# Patient Record
Sex: FEMALE | Race: OTHER | Employment: PART TIME | URBAN - METROPOLITAN AREA
[De-identification: names, ages, dates, MRNs, and addresses within clinical notes are randomized per-mention and may not be internally consistent; named-entity substitution may affect disease eponyms.]

---
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
## 2020-06-26 ENCOUNTER — OFFICE VISIT (OUTPATIENT)
Dept: PRIMARY CARE CLINIC | Age: 20
End: 2020-06-26
Payer: MEDICARE

## 2020-06-26 LAB — SARS-COV-2, NAAT: NOT DETECTED

## 2020-06-26 PROCEDURE — G8421 BMI NOT CALCULATED: HCPCS | Performed by: NURSE PRACTITIONER

## 2020-06-26 PROCEDURE — 99211 OFF/OP EST MAY X REQ PHY/QHP: CPT | Performed by: NURSE PRACTITIONER

## 2020-06-26 PROCEDURE — G8428 CUR MEDS NOT DOCUMENT: HCPCS | Performed by: NURSE PRACTITIONER

## 2020-06-26 NOTE — PROGRESS NOTES
Celso Mahoney received a viral test for COVID-19. They were educated on isolation and quarantine as appropriate. For any symptoms, they were directed to seek care from their PCP, given contact information to establish with a doctor, directed to an urgent care or the emergency room.

## 2020-11-01 ENCOUNTER — HOSPITAL ENCOUNTER (OUTPATIENT)
Age: 20
Discharge: HOME | End: 2020-11-01
Attending: EMERGENCY MEDICINE | Admitting: OBSTETRICS & GYNECOLOGY
Payer: MEDICARE

## 2020-11-01 VITALS
TEMPERATURE: 98.8 F | RESPIRATION RATE: 18 BRPM | WEIGHT: 130 LBS | BODY MASS INDEX: 23.04 KG/M2 | HEIGHT: 63 IN | OXYGEN SATURATION: 100 % | DIASTOLIC BLOOD PRESSURE: 67 MMHG | SYSTOLIC BLOOD PRESSURE: 98 MMHG | HEART RATE: 80 BPM

## 2020-11-01 LAB
A/G RATIO: 1 (ref 1.1–2.2)
ABO/RH: NORMAL
ALBUMIN SERPL-MCNC: 3.1 G/DL (ref 3.4–5)
ALP BLD-CCNC: 64 U/L (ref 40–129)
ALT SERPL-CCNC: 6 U/L (ref 10–40)
ANION GAP SERPL CALCULATED.3IONS-SCNC: 11 MMOL/L (ref 3–16)
AST SERPL-CCNC: 10 U/L (ref 15–37)
BACTERIA: ABNORMAL /HPF
BASOPHILS ABSOLUTE: 0 K/UL (ref 0–0.2)
BASOPHILS RELATIVE PERCENT: 0.6 %
BILIRUB SERPL-MCNC: 0.4 MG/DL (ref 0–1)
BILIRUBIN URINE: NEGATIVE
BLOOD, URINE: NEGATIVE
BUN BLDV-MCNC: 6 MG/DL (ref 7–20)
CALCIUM SERPL-MCNC: 8.6 MG/DL (ref 8.3–10.6)
CHLORIDE BLD-SCNC: 98 MMOL/L (ref 99–110)
CLARITY: ABNORMAL
CO2: 22 MMOL/L (ref 21–32)
COLOR: YELLOW
CREAT SERPL-MCNC: 0.5 MG/DL (ref 0.6–1.1)
EOSINOPHILS ABSOLUTE: 0.2 K/UL (ref 0–0.6)
EOSINOPHILS RELATIVE PERCENT: 2.8 %
EPITHELIAL CELLS, UA: 13 /HPF (ref 0–5)
GFR AFRICAN AMERICAN: >60
GFR NON-AFRICAN AMERICAN: >60
GLOBULIN: 3.1 G/DL
GLUCOSE BLD-MCNC: 78 MG/DL (ref 70–99)
GLUCOSE URINE: NEGATIVE MG/DL
HCT VFR BLD CALC: 30.8 % (ref 36–48)
HEMOGLOBIN: 10.2 G/DL (ref 12–16)
HYALINE CASTS: 5 /LPF (ref 0–8)
KETONES, URINE: NEGATIVE MG/DL
LEUKOCYTE ESTERASE, URINE: ABNORMAL
LYMPHOCYTES ABSOLUTE: 1 K/UL (ref 1–5.1)
LYMPHOCYTES RELATIVE PERCENT: 12 %
MCH RBC QN AUTO: 29 PG (ref 26–34)
MCHC RBC AUTO-ENTMCNC: 33 G/DL (ref 31–36)
MCV RBC AUTO: 87.7 FL (ref 80–100)
MICROSCOPIC EXAMINATION: YES
MONOCYTES ABSOLUTE: 0.5 K/UL (ref 0–1.3)
MONOCYTES RELATIVE PERCENT: 6.8 %
NEUTROPHILS ABSOLUTE: 6.2 K/UL (ref 1.7–7.7)
NEUTROPHILS RELATIVE PERCENT: 77.8 %
NITRITE, URINE: NEGATIVE
PDW BLD-RTO: 14.9 % (ref 12.4–15.4)
PH UA: 7 (ref 5–8)
PLATELET # BLD: 237 K/UL (ref 135–450)
PMV BLD AUTO: 10.7 FL (ref 5–10.5)
POTASSIUM REFLEX MAGNESIUM: 3.8 MMOL/L (ref 3.5–5.1)
PROTEIN UA: NEGATIVE MG/DL
RBC # BLD: 3.51 M/UL (ref 4–5.2)
RBC UA: 3 /HPF (ref 0–4)
SODIUM BLD-SCNC: 131 MMOL/L (ref 136–145)
SPECIFIC GRAVITY UA: 1.03 (ref 1–1.03)
TOTAL PROTEIN: 6.2 G/DL (ref 6.4–8.2)
URINE TYPE: ABNORMAL
UROBILINOGEN, URINE: 1 E.U./DL
WBC # BLD: 7.9 K/UL (ref 4–11)
WBC UA: 12 /HPF (ref 0–5)

## 2020-11-01 PROCEDURE — 99203 OFFICE O/P NEW LOW 30 MIN: CPT

## 2020-11-01 PROCEDURE — 85025 COMPLETE CBC W/AUTO DIFF WBC: CPT

## 2020-11-01 PROCEDURE — 80053 COMPREHEN METABOLIC PANEL: CPT

## 2020-11-01 PROCEDURE — 86901 BLOOD TYPING SEROLOGIC RH(D): CPT

## 2020-11-01 PROCEDURE — 36415 COLL VENOUS BLD VENIPUNCTURE: CPT

## 2020-11-01 PROCEDURE — 99284 EMERGENCY DEPT VISIT MOD MDM: CPT

## 2020-11-01 PROCEDURE — 81001 URINALYSIS AUTO W/SCOPE: CPT

## 2020-11-01 PROCEDURE — 86900 BLOOD TYPING SEROLOGIC ABO: CPT

## 2020-11-01 ASSESSMENT — ENCOUNTER SYMPTOMS
NAUSEA: 0
WHEEZING: 0
SORE THROAT: 0
VOMITING: 0
SHORTNESS OF BREATH: 0
DIARRHEA: 0
ABDOMINAL PAIN: 0
EYE DISCHARGE: 0
BACK PAIN: 0
EYE PAIN: 0
RHINORRHEA: 0
COUGH: 0

## 2020-11-01 ASSESSMENT — PAIN DESCRIPTION - LOCATION: LOCATION: ABDOMEN

## 2020-11-01 ASSESSMENT — PAIN DESCRIPTION - PAIN TYPE: TYPE: ACUTE PAIN

## 2020-11-01 ASSESSMENT — PAIN SCALES - GENERAL: PAINLEVEL_OUTOF10: 5

## 2020-11-01 NOTE — PROGRESS NOTES
Department of Obstetrics and Gynecology  Triage Evaluation Note    SUBJECTIVE:  Jamie Sousa is a 23 y.o.,  at unknown gestation  who presents for abd discomfort. She denies vaginal bleeding, leakage of fluid, or contractions. She states the baby is  moving well. She denies fever, shortness of breath, palpitations, nausea, vomiting, diarrhea on ROS. I personally reviewed the past medical and surgical histories, as well as the problem list.    OBJECTIVE:  Vital Signs: BP 98/67   Pulse 80   Temp 98.8 °F (37.1 °C) (Oral)   Resp 18   Ht 5' 3\" (1.6 m)   Wt 130 lb (59 kg)   SpO2 100%   BMI 23.03 kg/m²   Appearance/Psychiatric: alert and oriented X3  Constitutional: Appears well, no distress  Cardiovascular: She does not have edema. Respiratory:Respiratory effort is normal.  Gastrointestinal: soft, non tender, Gravid. The uterine size is equal to 20-22 weeks    Pelvic: Cervix NE.   NST read: +FHT  [place]: NA    LABS:    CBC:   Lab Results   Component Value Date    WBC 7.9 2020    RBC 3.51 2020    HGB 10.2 2020    HCT 30.8 2020    MCV 87.7 2020    RDW 14.9 2020     2020       Urine Drug Screen negative    ASSESSMENT AND PLAN:  23 y.o.  approx 21 wk gestation. No clear source of abd discomfort. Nl WBC, abd is gravid minimally tender but non surgical. Has appt with Dr. Bj Rojas in am. Will keep appt. Await UA results  <principal problem not specified>    The patient will follow up in 1 day with . She was counseled to call or return for vaginal bleeding, regular contractions, leakage of fluid or decreased fetal movement.     Electronically signed by Rigoberto Bedolla MD on 2020 at 11:58 AM

## 2020-11-01 NOTE — ED PROVIDER NOTES
[facility]  eMERGENCY dEPARTMENT eNCOUnter        Pt Name: Trevon Amor  MRN: [id number]  [place] 2000  Date of evaluation: 11/1/2020  Provider: Екатерина Grubbs MD  PCP: [facility]       Chief Complaint   Patient presents with    Abdominal Pain     intermit cramping    Pregnancy Problem     pt states no preinatal care, has appointment tomorrow, woke up with pain        HISTORY OFPRESENT ILLNESS   (Location/Symptom, Timing/Onset, Context/Setting, Quality, Duration, Modifying Factors,Severity)  Note limiting factors. Trevon mAor is a 23 y.o. female       Location/Symptom: Pelvic pain  Timing/Onset: This morning  Context/Setting: Gravid 2 para 1. She is not really sure of her last normal menstrual period but it was either June or July. States she has been under a lot of stress and was not really paying attention. She not having any vaginal bleeding. Quality: Sharp and crampy  Duration: Repeated episodes lasting about a minute  Modifying Factors: No fever chills nausea vomiting or urinary symptoms and no vaginal bleeding  Severity: 5 out of 10    Nursing Noteswere all reviewed and agreed with or any disagreements were addressed  in the HPI. REVIEW OF SYSTEMS    (2-9 systems for level 4, 10 or more for level 5)     Review of Systems   Constitutional: Negative for chills, fatigue and fever. HENT: Negative for ear pain, rhinorrhea and sore throat. Eyes: Negative for pain, discharge and visual disturbance. Respiratory: Negative for cough, shortness of breath and wheezing. Cardiovascular: Negative for chest pain, palpitations and leg swelling. Gastrointestinal: Negative for abdominal pain, diarrhea, nausea and vomiting. Genitourinary: Positive for pelvic pain. Negative for difficulty urinating, dysuria and vaginal discharge. Musculoskeletal: Negative for arthralgias, back pain, joint swelling and neck pain.    Skin: Negative for rash.   Allergic/Immunologic: Negative for environmental allergies. Neurological: Negative for dizziness, seizures, syncope and headaches. Hematological: Negative for adenopathy. Psychiatric/Behavioral: Negative for dysphoric mood and suicidal ideas. The patient is not nervous/anxious. PAST MEDICAL HISTORY     Past Medical History:   Diagnosis Date    Asthma     Chiari malformation     Chronic kidney disease     kidney stones    Seizures ([facility] [address]     Epilepsy         SURGICAL HISTORY     Past Surgical History:   Procedure Laterality Date    TONSILLECTOMY           CURRENTMEDICATIONS       Discharge Medication List as of 11/1/2020 12:45 PM      CONTINUE these medications which have NOT CHANGED    Details   topiramate (TOPAMAX) 25 MG tablet Take 150 mg by mouth 2 times daily Historical Med      Ascorbic Acid (VITAMIN C) 250 MG tablet Take 250 mg by mouth 2 times dailyHistorical Med      Prenatal w/o A Vit-Fe Fum-FA (PRENATA PO) Take 1 tablet by mouth dailyHistorical Med      folic acid (FOLVITE) 1 MG tablet Take 1 mg by mouth daily             ALLERGIES     Patient has no known allergies.     FAMILY HISTORY       Family History   Problem Relation Age of Onset    Depression Mother     High Blood Pressure Mother     Allergy (Severe) Sister    [place] Anemia Sister     Asthma Sister     Depression Sister     Kidney Disease Sister     Mental Illness Sister     Anemia Brother     Asthma Brother     Breast Cancer Maternal Grandfather     Hearing Loss Maternal Grandfather     Cancer Paternal Grandmother     Prostate Cancer Paternal Grandmother           SOCIAL HISTORY       Social History     Socioeconomic History    Marital status: Single     Spouse name: None    Number of children: None    Years of education: None    Highest education level: None   Occupational History    None   Social Needs    Financial resource strain: None    Food insecurity     Worry: None     Inability: None    Transportation needs     Medical: None     Non-medical: None   Tobacco Use    Smoking status: Never Smoker    Smokeless tobacco: Never Used   Substance and Sexual Activity    Alcohol use: No    Drug use: No    Sexual activity: Yes   Lifestyle    Physical activity     Days per week: None     Minutes per session: None    Stress: None   Relationships    Social connections     Talks on phone: None     Gets together: None     Attends Islam service: None     Active member of club or organization: None     Attends meetings of clubs or organizations: None     Relationship status: None    Intimate partner violence     Fear of current or ex partner: None     Emotionally abused: None     Physically abused: None     Forced sexual activity: None   Other Topics Concern    None   Social History Narrative    None       SCREENINGS    [place] Coma Scale  Eye Opening: Spontaneous  Best Verbal Response: Oriented  Best Motor Response: Obeys commands  [place] Coma Scale Score: 15        PHYSICAL EXAM    (up to 7 for level 4, 8 or more for level 5)     ED Triage Vitals   BP Temp Temp src Pulse Resp SpO2 Height Weight   -- -- -- -- -- -- -- --      height is 5' 3\" (1.6 m) and weight is 130 lb (59 kg). Her oral temperature is 98.8 °F (37.1 °C). Her blood pressure is 98/67 and her pulse is 80. Her respiration is 18 and oxygen saturation is 100%. Physical Exam  Constitutional:       Appearance: She is well-developed. She is not diaphoretic. HENT:      Head: Normocephalic and atraumatic. Right Ear: External ear normal.      Left Ear: External ear normal.   Eyes:      General: No scleral icterus. Right eye: No discharge. Left eye: No discharge. Neck:      Musculoskeletal: Normal range of motion. Thyroid: No thyromegaly. Vascular: No JVD. Trachea: No tracheal deviation. Cardiovascular:      Rate and Rhythm: Normal rate and regular rhythm. Heart sounds: No murmur. No friction rub. No gallop. Pulmonary:      Effort: Pulmonary effort is normal. No respiratory distress. Breath sounds: Normal breath sounds. No stridor. No wheezing or rales. Abdominal:      General: There is no distension. Palpations: Abdomen is soft. Tenderness: There is abdominal tenderness in the periumbilical area and suprapubic area. There is no guarding or rebound. Comments: Gravid uterus. I can feel the dome of the uterus just slightly above the umbilicus. The uterus itself is what is tender. The rest of the abdomen is benign without rebound or guarding. Musculoskeletal:         General: No tenderness. Skin:     General: Skin is warm and dry. Findings: No rash (On exposed body surfaces). Neurological:      Mental Status: She is alert and oriented to person, place, and time. Coordination: Coordination normal.   Psychiatric:         Behavior: Behavior normal.         Thought Content:  Thought content normal.         DIAGNOSTIC RESULTS   LABS:    Results for orders placed or performed during the hospital encounter of 11/01/20   CBC Auto Differential   Result Value Ref Range    WBC 7.9 4.0 - 11.0 K/uL    RBC 3.51 (L) 4.00 - 5.20 M/uL    Hemoglobin 10.2 (L) 12.0 - 16.0 g/dL    Hematocrit 30.8 (L) 36.0 - 48.0 %    MCV 87.7 80.0 - 100.0 fL    MCH 29.0 26.0 - 34.0 pg    MCHC 33.0 31.0 - 36.0 g/dL    RDW 14.9 12.4 - 15.4 %    Platelets 587 204 - 230 K/uL    MPV 10.7 (H) 5.0 - 10.5 fL    Neutrophils % 77.8 %    Lymphocytes % 12.0 %    Monocytes % 6.8 %    Eosinophils % 2.8 %    Basophils % 0.6 %    Neutrophils Absolute 6.2 1.7 - 7.7 K/uL    Lymphocytes Absolute 1.0 1.0 - 5.1 K/uL    Monocytes Absolute 0.5 0.0 - 1.3 K/uL    Eosinophils Absolute 0.2 0.0 - 0.6 K/uL    Basophils Absolute 0.0 0.0 - 0.2 K/uL   Comprehensive Metabolic Panel w/ Reflex to MG   Result Value Ref Range    Sodium 131 (L) 136 - 145 mmol/L    Potassium reflex Magnesium 3.8 3.5 - 5.1 mmol/L    Chloride 98 (L) 99 - 110 mmol/L    CO2 22 21 - 32 mmol/L    Anion Gap 11 3 - 16    Glucose 78 70 - 99 mg/dL    BUN 6 (L) 7 - 20 mg/dL    CREATININE 0.5 (L) 0.6 - 1.1 mg/dL    GFR Non-African American >60 >60    GFR African American >60 >60    Calcium 8.6 8.3 - 10.6 mg/dL    Total Protein 6.2 (L) 6.4 - 8.2 g/dL    Alb 3.1 (L) 3.4 - 5.0 g/dL    Albumin/Globulin Ratio 1.0 (L) 1.1 - 2.2    Total Bilirubin 0.4 0.0 - 1.0 mg/dL    Alkaline Phosphatase 64 40 - 129 U/L    ALT 6 (L) 10 - 40 U/L    AST 10 (L) 15 - 37 U/L    Globulin 3.1 g/dL   Urinalysis   Result Value Ref Range    Color, UA YELLOW Straw/Yellow    Clarity, UA CLOUDY (A) Clear    Glucose, Ur Negative Negative mg/dL    Bilirubin Urine Negative Negative    Ketones, Urine Negative Negative mg/dL    Specific Gravity, UA 1.026 1.005 - 1.030    Blood, Urine Negative Negative    pH, UA 7.0 5.0 - 8.0    Protein, UA Negative Negative mg/dL    Urobilinogen, Urine 1.0 <2.0 E.U./dL    Nitrite, Urine Negative Negative    Leukocyte Esterase, Urine MODERATE (A) Negative    Microscopic Examination YES     Urine Type NotGiven    Microscopic Urinalysis   Result Value Ref Range    Bacteria, UA 1+ (A) None Seen /HPF    Hyaline Casts, UA 5 0 - 8 /LPF    WBC, UA 12 (H) 0 - 5 /HPF    RBC, UA 3 0 - 4 /HPF    Epithelial Cells, UA 13 (H) 0 - 5 /HPF   ABO/RH   Result Value Ref Range    ABO/Rh O POS        All other labs were within normal range or not returned as of this dictation. EKG: All EKG's are interpreted by the Emergency Department Physician who either signs orCo-signs this chart in the absence of a cardiologist.    None    RADIOLOGY:   Non-plain film images such as CT, Ultrasound and MRI are read by the radiologist. Plain radiographic images are visualized and preliminarily interpreted by the  EDProvider with the below findings:    I discussed this case with Dr. Denisse Carmona on-call for obstetrics. He has for me to get a formal ultrasound to confirm gestational age.   So, I did order one but I was informed by the ultrasound technician that they only come in for rule out ectopics with regards to pregnancy. I did a bedside ultrasound. The patient is clearly pregnant. Fetal heart tones were 144-150. I was unable to save images to the machine just to be acuity and volume in the department. PROCEDURES   Unless otherwise noted below, none     Procedures    CRITICAL CARE TIME   N/A    CONSULTS:  None    EMERGENCY DEPARTMENT COURSE and DIFFERENTIAL DIAGNOSIS/MDM:   Vitals:    Vitals:    11/01/20 1115 11/01/20 1147 11/01/20 1156   BP: 99/70  98/67   Pulse: 60  80   Resp: 20  18   Temp: 98.6 °F (37 °C)  98.8 °F (37.1 °C)   TempSrc: Oral  Oral   SpO2: 100%     Weight:   130 lb (59 kg)   Height:  5' 3\" (1.6 m)        Patient was given the following medications:  Medications - No data to display    I called Dr. Baron Kay back and explained to him the issue with ultrasound so at that point he agreed to see the patient in labor and delivery. Did order an IV and a set of labs as well as a blood type. FINAL IMPRESSION      1. Abdominal pain during pregnancy in second trimester          DISPOSITION/PLAN    DISPOSITION Decision To Discharge 11/01/2020 11:33:17 AM      PATIENT REFERRED TO:  MD Benjamín Boyd [facility]. [phone]  [phone]    Go in 1 day  follow-up with Dr Ivonne Barajas on 11/02/2020 at 1100.  Yvonne PEREIRA review urine culture obtained at [address] [place] [address] L:  Discharge Medication List as of 11/1/2020 12:45 PM          DISCONTINUED MEDICATIONS:  Discharge Medication List as of 11/1/2020 12:45 PM                 (Please note that portions of this note were completed with a voice recognition program.  Efforts were made to editthe dictations but occasionally words are mis-transcribed.)    Leigha Galvez MD (electronically signed)            Leigha Galvez MD  11/01/20 2519

## 2020-11-01 NOTE — FLOWSHEET NOTE
After completion of the Medical Screening Evaluation, it has been determined that a medical emergency does not exist and the patient is not in active labor, and therefore the patient may be discharged home. IV site placed in ED removed. D/C instructions given and reviewed. Pt discharged home per W/C to Lisa car with belongings.

## 2021-08-21 ENCOUNTER — HOSPITAL ENCOUNTER (INPATIENT)
Age: 21
LOS: 4 days | Discharge: HOME | DRG: 751 | End: 2021-08-25
Attending: EMERGENCY MEDICINE | Admitting: PSYCHIATRY & NEUROLOGY
Payer: MEDICARE

## 2021-08-21 DIAGNOSIS — R45.851 SUICIDAL IDEATION: Primary | ICD-10-CM

## 2021-08-21 PROBLEM — F39 UNSPECIFIED MOOD (AFFECTIVE) DISORDER (HCC): Status: ACTIVE | Noted: 2021-08-21

## 2021-08-21 LAB
A/G RATIO: 1.6 (ref 1.1–2.2)
ACETAMINOPHEN LEVEL: <5 UG/ML (ref 10–30)
ALBUMIN SERPL-MCNC: 4.2 G/DL (ref 3.4–5)
ALP BLD-CCNC: 60 U/L (ref 40–129)
ALT SERPL-CCNC: 13 U/L (ref 10–40)
AMPHETAMINE SCREEN, URINE: NORMAL
ANION GAP SERPL CALCULATED.3IONS-SCNC: 9 MMOL/L (ref 3–16)
AST SERPL-CCNC: 16 U/L (ref 15–37)
BACTERIA: ABNORMAL /HPF
BARBITURATE SCREEN URINE: NORMAL
BASOPHILS ABSOLUTE: 0 K/UL (ref 0–0.2)
BASOPHILS RELATIVE PERCENT: 0.4 %
BENZODIAZEPINE SCREEN, URINE: NORMAL
BILIRUB SERPL-MCNC: 1 MG/DL (ref 0–1)
BILIRUBIN URINE: NEGATIVE
BLOOD, URINE: NEGATIVE
BUN BLDV-MCNC: 13 MG/DL (ref 7–20)
CALCIUM SERPL-MCNC: 9.3 MG/DL (ref 8.3–10.6)
CANNABINOID SCREEN URINE: NORMAL
CHLORIDE BLD-SCNC: 103 MMOL/L (ref 99–110)
CLARITY: CLEAR
CO2: 22 MMOL/L (ref 21–32)
COCAINE METABOLITE SCREEN URINE: NORMAL
COLOR: YELLOW
CREAT SERPL-MCNC: 0.8 MG/DL (ref 0.6–1.1)
EOSINOPHILS ABSOLUTE: 0 K/UL (ref 0–0.6)
EOSINOPHILS RELATIVE PERCENT: 0.6 %
EPITHELIAL CELLS, UA: ABNORMAL /HPF (ref 0–5)
ETHANOL: NORMAL MG/DL (ref 0–0.08)
GFR AFRICAN AMERICAN: >60
GFR NON-AFRICAN AMERICAN: >60
GLOBULIN: 2.6 G/DL
GLUCOSE BLD-MCNC: 98 MG/DL (ref 70–99)
GLUCOSE URINE: NEGATIVE MG/DL
HCG(URINE) PREGNANCY TEST: NEGATIVE
HCT VFR BLD CALC: 33.3 % (ref 36–48)
HEMOGLOBIN: 10.8 G/DL (ref 12–16)
KETONES, URINE: NEGATIVE MG/DL
LEUKOCYTE ESTERASE, URINE: ABNORMAL
LYMPHOCYTES ABSOLUTE: 0.8 K/UL (ref 1–5.1)
LYMPHOCYTES RELATIVE PERCENT: 11.6 %
Lab: NORMAL
MCH RBC QN AUTO: 28 PG (ref 26–34)
MCHC RBC AUTO-ENTMCNC: 32.3 G/DL (ref 31–36)
MCV RBC AUTO: 86.5 FL (ref 80–100)
METHADONE SCREEN, URINE: NORMAL
MICROSCOPIC EXAMINATION: YES
MONOCYTES ABSOLUTE: 0.4 K/UL (ref 0–1.3)
MONOCYTES RELATIVE PERCENT: 5.3 %
NEUTROPHILS ABSOLUTE: 5.5 K/UL (ref 1.7–7.7)
NEUTROPHILS RELATIVE PERCENT: 82.1 %
NITRITE, URINE: NEGATIVE
OPIATE SCREEN URINE: NORMAL
OXYCODONE URINE: NORMAL
PDW BLD-RTO: 15.3 % (ref 12.4–15.4)
PH UA: 6
PH UA: 6 (ref 5–8)
PHENCYCLIDINE SCREEN URINE: NORMAL
PLATELET # BLD: 286 K/UL (ref 135–450)
PMV BLD AUTO: 10.8 FL (ref 5–10.5)
POTASSIUM REFLEX MAGNESIUM: 3.8 MMOL/L (ref 3.5–5.1)
PROPOXYPHENE SCREEN: NORMAL
PROTEIN UA: NEGATIVE MG/DL
RBC # BLD: 3.85 M/UL (ref 4–5.2)
RBC UA: ABNORMAL /HPF (ref 0–4)
SALICYLATE, SERUM: <0.3 MG/DL (ref 15–30)
SARS-COV-2, NAAT: NOT DETECTED
SODIUM BLD-SCNC: 134 MMOL/L (ref 136–145)
SPECIFIC GRAVITY UA: >=1.03 (ref 1–1.03)
TOTAL PROTEIN: 6.8 G/DL (ref 6.4–8.2)
URINE REFLEX TO CULTURE: ABNORMAL
URINE TYPE: ABNORMAL
UROBILINOGEN, URINE: 0.2 E.U./DL
WBC # BLD: 6.7 K/UL (ref 4–11)
WBC UA: ABNORMAL /HPF (ref 0–5)

## 2021-08-21 PROCEDURE — 80143 DRUG ASSAY ACETAMINOPHEN: CPT

## 2021-08-21 PROCEDURE — 81001 URINALYSIS AUTO W/SCOPE: CPT

## 2021-08-21 PROCEDURE — 6370000000 HC RX 637 (ALT 250 FOR IP): Performed by: PSYCHIATRY & NEUROLOGY

## 2021-08-21 PROCEDURE — 87635 SARS-COV-2 COVID-19 AMP PRB: CPT

## 2021-08-21 PROCEDURE — 82077 ASSAY SPEC XCP UR&BREATH IA: CPT

## 2021-08-21 PROCEDURE — 93005 ELECTROCARDIOGRAM TRACING: CPT | Performed by: EMERGENCY MEDICINE

## 2021-08-21 PROCEDURE — 1240000000 HC EMOTIONAL WELLNESS R&B

## 2021-08-21 PROCEDURE — 80307 DRUG TEST PRSMV CHEM ANLYZR: CPT

## 2021-08-21 PROCEDURE — 99284 EMERGENCY DEPT VISIT MOD MDM: CPT

## 2021-08-21 PROCEDURE — 80053 COMPREHEN METABOLIC PANEL: CPT

## 2021-08-21 PROCEDURE — 84703 CHORIONIC GONADOTROPIN ASSAY: CPT

## 2021-08-21 PROCEDURE — 80179 DRUG ASSAY SALICYLATE: CPT

## 2021-08-21 PROCEDURE — 85025 COMPLETE CBC W/AUTO DIFF WBC: CPT

## 2021-08-21 RX ORDER — TRAZODONE HYDROCHLORIDE 50 MG/1
50 TABLET ORAL NIGHTLY PRN
Status: DISCONTINUED | OUTPATIENT
Start: 2021-08-21 | End: 2021-08-25 | Stop reason: HOSPADM

## 2021-08-21 RX ORDER — OLANZAPINE 10 MG/1
10 INJECTION, POWDER, LYOPHILIZED, FOR SOLUTION INTRAMUSCULAR 3 TIMES DAILY PRN
Status: DISCONTINUED | OUTPATIENT
Start: 2021-08-21 | End: 2021-08-25 | Stop reason: HOSPADM

## 2021-08-21 RX ORDER — OLANZAPINE 5 MG/1
5 TABLET ORAL EVERY 6 HOURS PRN
Status: DISCONTINUED | OUTPATIENT
Start: 2021-08-21 | End: 2021-08-25 | Stop reason: HOSPADM

## 2021-08-21 RX ORDER — HYDROXYZINE HYDROCHLORIDE 10 MG/1
50 TABLET, FILM COATED ORAL EVERY 6 HOURS PRN
Status: DISCONTINUED | OUTPATIENT
Start: 2021-08-21 | End: 2021-08-25 | Stop reason: HOSPADM

## 2021-08-21 RX ORDER — MAGNESIUM HYDROXIDE/ALUMINUM HYDROXICE/SIMETHICONE 120; 1200; 1200 MG/30ML; MG/30ML; MG/30ML
30 SUSPENSION ORAL EVERY 6 HOURS PRN
Status: DISCONTINUED | OUTPATIENT
Start: 2021-08-21 | End: 2021-08-25 | Stop reason: HOSPADM

## 2021-08-21 RX ORDER — ACETAMINOPHEN 325 MG/1
650 TABLET ORAL EVERY 4 HOURS PRN
Status: DISCONTINUED | OUTPATIENT
Start: 2021-08-21 | End: 2021-08-25 | Stop reason: HOSPADM

## 2021-08-21 RX ORDER — BENZTROPINE MESYLATE 1 MG/ML
2 INJECTION INTRAMUSCULAR; INTRAVENOUS 2 TIMES DAILY PRN
Status: DISCONTINUED | OUTPATIENT
Start: 2021-08-21 | End: 2021-08-25 | Stop reason: HOSPADM

## 2021-08-21 RX ADMIN — TRAZODONE HYDROCHLORIDE 50 MG: 50 TABLET ORAL at 22:12

## 2021-08-21 RX ADMIN — ACETAMINOPHEN 650 MG: 325 TABLET ORAL at 22:12

## 2021-08-21 ASSESSMENT — SLEEP AND FATIGUE QUESTIONNAIRES
DO YOU HAVE DIFFICULTY SLEEPING: NO
DO YOU USE A SLEEP AID: NO

## 2021-08-21 ASSESSMENT — PAIN SCALES - GENERAL
PAINLEVEL_OUTOF10: 2
PAINLEVEL_OUTOF10: 7

## 2021-08-21 ASSESSMENT — LIFESTYLE VARIABLES: HISTORY_ALCOHOL_USE: NO

## 2021-08-21 NOTE — ED NOTES
Patient reports she feels safe here and reports she will not harm self here. Repots she feels better. Patient able to contract for safety will notify Dr. Hattie Gregorying.       Benigno Dean RN  08/21/21 8766
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
## 2021-08-21 NOTE — ED PROVIDER NOTES
 Kidney Disease Sister     Mental Illness Sister     Anemia Brother     Asthma Brother     Breast Cancer Maternal Grandfather     Hearing Loss Maternal Grandfather     Cancer Paternal Grandmother     Prostate Cancer Paternal Grandmother        Social History     Socioeconomic History    Marital status: Single     Spouse name: Not on file    Number of children: Not on file    Years of education: Not on file    Highest education level: Not on file   Occupational History    Not on file   Tobacco Use    Smoking status: Never Smoker    Smokeless tobacco: Never Used   Vaping Use    Vaping Use: Never used   Substance and Sexual Activity    Alcohol use: No    Drug use: No    Sexual activity: Yes   Other Topics Concern    Not on file   Social History Narrative    Not on file     Social Determinants of Health     Financial Resource Strain:     Difficulty of Paying Living Expenses:    Food Insecurity:     Worried About Running Out of Food in the Last Year:     Ran Out of Food in the Last Year:    Transportation Needs:     Lack of Transportation (Medical):  Lack of Transportation (Non-Medical):    Physical Activity:     Days of Exercise per Week:     Minutes of Exercise per Session:    Stress:     Feeling of Stress :    Social Connections:     Frequency of Communication with Friends and Family:     Frequency of Social Gatherings with Friends and Family:     Attends Yazidi Services:     Active Member of Clubs or Organizations:     Attends Club or Organization Meetings:     Marital Status:    Intimate Partner Violence:     Fear of Current or Ex-Partner:     Emotionally Abused:     Physically Abused:     Sexually Abused:        Nursing notes reviewed.     ED Triage Vitals [08/21/21 1306]   Enc Vitals Group      /75      Pulse 107      Resp 16      Temp 98.2 °F (36.8 °C)      Temp Source Oral      SpO2 98 %      Weight 160 lb (72.6 kg)      Height 5' 3\" (1.6 m)      Head Circumference       Peak Flow       Pain Score       Pain Loc       Pain Edu? Excl. in [address]? GENERAL:  Awake, alert. Well developed, well nourished with no apparent distress. HENT:  Normocephalic, Atraumatic, moist mucous membranes. EYES:  Pupils equal round and reactive to light, Conjunctiva normal, extraocular movements normal.  NECK:  No meningeal signs, Supple. CHEST:  Regular rate and rhythm, chest wall non-tender. LUNGS:  Clear to auscultation bilaterally. ABDOMEN:  Soft, non-tender, no rebound, rigidity or guarding, non-distended, normal bowel sounds. No costovertebral angle tenderness to palpation. BACK:  No tenderness. EXTREMITIES:  Normal range of motion, no edema, no bony tenderness, no deformity, distal pulses present. SKIN: Warm, dry and intact. NEUROLOGIC: Awake, alert and oriented to person, place and time. Strength 5/5 in bilateral upper and lower extremities. Sensation is intact to light touch in the upper and lower extremities. Cranial Nerves 2-12 are intact. Patellar DTRs intact. Finger-to-nose intact. LABS and DIAGNOSTIC RESULTS  EKG  The Ekg interpreted by me shows  normal sinus rhythm with a rate of 72  Axis is   Normal  QTc is  normal  Intervals and Durations are unremarkable. ST Segments: normal  No change relative to EKG dated 1/21/2019      RADIOLOGY  X-RAYS:  I have reviewed radiologic plain film image(s). ALL OTHER NON-PLAIN FILM IMAGES SUCH AS CT, ULTRASOUND AND MRI HAVE BEEN READ BY THE RADIOLOGIST.   No orders to display        LABS  Labs Reviewed   CBC WITH AUTO DIFFERENTIAL - Abnormal; Notable for the following components:       Result Value    RBC 3.85 (*)     Hemoglobin 10.8 (*)     Hematocrit 33.3 (*)     MPV 10.8 (*)     Lymphocytes Absolute 0.8 (*)     All other components within normal limits    Narrative:     Performed at:  [facility],  [place], [facility]   Phone [phone] COMPREHENSIVE METABOLIC PANEL W/ REFLEX TO MG FOR LOW K - Abnormal; Notable for the following components:    Sodium 134 (*)     All other components within normal limits    Narrative:     Performed at:  [facility],  [employer]   Phone [phone]   URINE RT REFLEX TO CULTURE - Abnormal; Notable for the following components:    Leukocyte Esterase, Urine TRACE (*)     All other components within normal limits    Narrative:     Performed at:  [place] [address],  [employer]   Phone [phone]   ACETAMINOPHEN LEVEL - Abnormal; Notable for the following components:    Acetaminophen Level <5 (*)     All other components within normal limits    Narrative:     Performed at:  [place] [address],  [employer]   Phone [phone]   SALICYLATE LEVEL - Abnormal; Notable for the following components:    Salicylate, Serum <4.6 (*)     All other components within normal limits    Narrative:     Performed at:  [facility] [address],  [employer]   Phone [phone]   MICROSCOPIC URINALYSIS - Abnormal; Notable for the following components:    Bacteria, UA 2+ (*)     All other components within normal limits    Narrative:     Performed at:  [place] [address],  [employer]   Phone [phone], RAPID    Narrative:     Performed at:  [place] [address],  [employer]   Phone [phone]   ETHANOL    Narrative:     Performed at:  [place] [address],  [employer]   Phone [phone]   URINE DRUG SCREEN    Narrative:     Performed at:  [place] [address],  [employer]   Phone [phone] PREGNANCY, URINE    Narrative:     Performed at:  [facility]) - [facility]  [address],  [place], [facility]   Phone [phone]         MEDICAL DECISION MAKING          In my opinion the patient is medically stable for inpatient psychiatric treatment, medically cleared. FINAL IMPRESSION  1. Suicidal ideation    2. Post partum depression        Vitals:  Blood pressure 118/75, pulse 107, temperature 98.2 °F (36.8 °C), temperature source Oral, resp. rate 16, height 5' 3\" (1.6 m), weight 160 lb (72.6 kg), SpO2 98 %, unknown if currently breastfeeding. Disposition  Pt is in stable condition upon Admit to mental health unit - medically cleared for admission. This chart was generated using the [address] [place] [address] [address] dictation system. I created this record but it may contain dictation errors.           Opal Longoria MD  08/21/21 9396

## 2021-08-21 NOTE — ED NOTES
Level of Care Disposition: Admit     Patient was seen by ED provider and [facility] AN AFFILIATE OF [facility] staff. The case presented to psychiatric provider on-call Dr. Adelfo Dumont. Based on the ED evaluation and information presented to the provider by JOSE ALBERTO Lazo it is the recommendation of the on call psychiatric provider that inpatient hospitalization is the least restrictive environment for the patient at this time. The patient will be admitted to the inpatient unit.      Insurance Pre certification Authorization: Shilpi Clifford [place]  08/21/21 1537

## 2021-08-21 NOTE — BH NOTE
[facility]  Admission Note     Admission Type:   Admission Type: Voluntary    Reason for admission:  Reason for Admission: Increased depression and mood disturbance    PATIENT STRENGTHS:  Strengths: Communication, No significant Physical Illness, Social Skills    Patient Strengths and Limitations:  Limitations: Tendency to isolate self    Addictive Behavior:   Addictive Behavior  In the past 3 months, have you felt or has someone told you that you have a problem with:  : None  Do you have a history of Chemical Use?: No  Do you have a history of Alcohol Use?: No  Do you have a history of Street Drug Abuse?: No  Histroy of Prescripton Drug Abuse?: No    Medical Problems:   Past Medical History:   Diagnosis Date    Asthma     Chiari malformation     Chronic kidney disease     kidney stones    Seizures (HCC)     Epilepsy    [facility] 8/21/2021       Status EXAM:  Status and Exam  Normal: No  Facial Expression: Sad, Worried  Affect: Congruent  Level of Consciousness: Alert  Mood:Normal: No  Mood: Depressed, Sad (reported she has \"anger outbursts and anxiety when in crowds)  Motor Activity:Normal: Yes  Interview Behavior: Cooperative  Preception: Marvin to Person, Marvin to Time, Marvin to Place, Marvin to Situation  Attention:Normal: Yes  Thought Processes: Other(See comment) (linear)  Thought Content:Normal: Yes  Hallucinations: None  Delusions: No  Memory:Normal: Yes  Insight and Judgment: No  Insight and Judgment: Poor Judgment, Poor Insight  Present Suicidal Ideation: No  Present Homicidal Ideation: No    Tobacco Screening:  Practical Counseling, on admission, ricardo X, if applicable and completed (first 3 are required if patient doesn't refuse):            ( )  Recognizing danger situations (included triggers and roadblocks)                    ( )  Coping skills (new ways to manage stress, exercise, relaxation techniques, changing routine, distraction) ( )  Basic information about quitting (benefits of quitting, techniques in how to quit, available resources  ( ) Referral for counseling faxed to Christine                                           ( ) Patient refused counseling  ( ) Patient has not smoked in the last 30 days    Metabolic Screening:    No results found for: LABA1C    No results found for: CHOL  No results found for: TRIG  No results found for: HDL  No components found for: LDLCAL  No results found for: LABVLDL      Body mass index is 29.05 kg/m². BP Readings from Last 2 Encounters:   08/21/21 (!) 111/53   11/01/20 98/67           Pt admitted with followings belongings:  Dentures: None  Vision - Corrective Lenses: None  Hearing Aid: None  Jewelry: Other (Comment) (nose ring)  Body Piercings Removed: No  Clothing: Footwear, Shirt, Other (Comment), Undergarments (Comment) (shorts)  Were All Patient Medications Collected?: Not Applicable  Other Valuables: None     Patient's home medications were n/a. Patient oriented to surroundings and program expectations and copy of patient rights given. Received admission packet:  yes. Consents reviewed, signed yes. Refused nno. Patient verbalize understanding:  yes.   Patient education on precautions: yes                   Chivo Johns RN

## 2021-08-21 NOTE — ED NOTES
Report called and given to [facility].      Humberto Jasso, JOSE ALBERTO  08/21/21 9484

## 2021-08-22 LAB
EKG ATRIAL RATE: 72 BPM
EKG DIAGNOSIS: NORMAL
EKG P AXIS: 46 DEGREES
EKG P-R INTERVAL: 106 MS
EKG Q-T INTERVAL: 392 MS
EKG QRS DURATION: 80 MS
EKG QTC CALCULATION (BAZETT): 429 MS
EKG R AXIS: 72 DEGREES
EKG T AXIS: 48 DEGREES
EKG VENTRICULAR RATE: 72 BPM

## 2021-08-22 PROCEDURE — 99223 1ST HOSP IP/OBS HIGH 75: CPT | Performed by: PSYCHIATRY & NEUROLOGY

## 2021-08-22 PROCEDURE — 6370000000 HC RX 637 (ALT 250 FOR IP): Performed by: PSYCHIATRY & NEUROLOGY

## 2021-08-22 PROCEDURE — 1240000000 HC EMOTIONAL WELLNESS R&B

## 2021-08-22 PROCEDURE — 93010 ELECTROCARDIOGRAM REPORT: CPT | Performed by: INTERNAL MEDICINE

## 2021-08-22 PROCEDURE — 99222 1ST HOSP IP/OBS MODERATE 55: CPT | Performed by: PHYSICIAN ASSISTANT

## 2021-08-22 RX ORDER — NICOTINE 21 MG/24HR
1 PATCH, TRANSDERMAL 24 HOURS TRANSDERMAL DAILY
Status: DISCONTINUED | OUTPATIENT
Start: 2021-08-22 | End: 2021-08-25 | Stop reason: HOSPADM

## 2021-08-22 RX ADMIN — ACETAMINOPHEN 650 MG: 325 TABLET ORAL at 11:31

## 2021-08-22 RX ADMIN — TRAZODONE HYDROCHLORIDE 50 MG: 50 TABLET ORAL at 21:42

## 2021-08-22 ASSESSMENT — PAIN SCALES - GENERAL
PAINLEVEL_OUTOF10: 3
PAINLEVEL_OUTOF10: 0
PAINLEVEL_OUTOF10: 0

## 2021-08-22 NOTE — BH NOTE
.Purposeful Rounding  For Midnight Rounds      Patient Location: Patient room    Patient willing to engage in conversation: No    Presentation/behavior: sleeping    Affect: sleeping    Concerns reported: none    PRN medications given: Tylenol 650 mg po and Trazodone 50 mg po at 2212 which were effective    Environmental assessment: Room free from clutter, Clear path to bathroom  and Adequate lighting    Fall prevention interventions in place: n/a    Daily Junior Fall Risk Score: 55    Daily Kendrick Fall Risk Score: 0

## 2021-08-22 NOTE — PLAN OF CARE
Problem: Depressive Behavior With or Without Suicide Precautions:  Goal: Able to verbalize acceptance of life and situations over which he or she has no control  Description: Able to verbalize acceptance of life and situations over which he or she has no control  Outcome: Ongoing     Problem: Depressive Behavior With or Without Suicide Precautions:  Goal: Able to verbalize and/or display a decrease in depressive symptoms  Description: Able to verbalize and/or display a decrease in depressive symptoms  Outcome: Ongoing     Problem: Depressive Behavior With or Without Suicide Precautions:  Goal: Absence of self-harm  Description: Absence of self-harm  Outcome: Ongoing   Pt has been out on the unit throughout the evening. Pt seemed comfortable with peers and had said how nice all the peers had been with her. Pt had been on the phone with family and retired to her room following the call. Pt had talked with her 3year old and pt was having a difficult time not being at home. Pt was sad. Denies SI and contracts for safety. She stated she had a migraine that she rated as a 7/10. Alert oriented. Pt given Tylenol 650 mg po for the HA and Trazodone 50 mg po for sleep. .  Both seemed effective. At midnight pt was FLACC 0 and sleeping.

## 2021-08-22 NOTE — PROGRESS NOTES
Comments: + interactions, + contribution, and + engagement.         Time: 3018-8298      Type of Group: Wrap-up/Relaxation      Level of Participation: 13/15

## 2021-08-22 NOTE — BH NOTE
.Purposeful Rounding  For 2000 Rounding      Patient Location: Day room    Patient willing to engage in conversation: Yes    Presentation/behavior: Anxious, Controlled, Cooperative and Pleasant    Affect: Constricted and Anxious    Concerns reported: none    PRN medications given: none    Environmental assessment: Room free from clutter, Clear path to bathroom  and Adequate lighting    Fall prevention interventions in place: n/a    Daily [place] Fall Risk Score: 55    Daily Kendrick Fall Risk Score: 0

## 2021-08-22 NOTE — GROUP NOTE
Group Therapy Note    Date: 8/22/2021    Group Start Time: 1615  Group End Time: 1841  Group Topic: Healthy Living/Wellness    [address], RN        Group Therapy Note    Attendees: 13         Patient's Goal:  To turn negative words into positive when doing self-talk    Notes:  Patient was quiet but actively participated    Status After Intervention:  Improved    Participation Level: Active Listener and Interactive    Participation Quality: Appropriate and Attentive      Speech:  normal      Thought Process/Content: Linear      Affective Functioning: Congruent      Mood: depressed      Level of consciousness:  Alert      Response to Learning: Able to verbalize current knowledge/experience      Endings: None Reported    Modes of Intervention: Activity      Discipline Responsible: Registered Nurse      Signature:   Katy Escoto RN
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
## 2021-08-22 NOTE — PLAN OF CARE
Problem: Depressive Behavior With or Without Suicide Precautions:  Goal: Able to verbalize and/or display a decrease in depressive symptoms  Description: Able to verbalize and/or display a decrease in depressive symptoms  8/22/2021 1315 by Myriam Gordon LPN  Outcome: Ongoing     Problem: Depressive Behavior With or Without Suicide Precautions:  Goal: Absence of self-harm  Description: Absence of self-harm  8/22/2021 1315 by Myriam Gordon LPN  Outcome: Ongoing   Lurlene Trued has been regressed to room and self this shift. Patient is calm, cooperative and currently denies SI/HI/AVH. Will continue to monitor for safety.

## 2021-08-22 NOTE — H&P
Hospital Medicine History & Physical      PCP: No primary care provider on file. Date of Admission: 8/21/2021    Date of Service: Pt seen/examined on 8/22/2021    Chief Complaint:    Chief Complaint   [facility]     [facility]ought [facility] by [facility]. reports called for suicidal. Reports patient was banging head against wall, attempted to put purse strap around neck. family stopped patient from harming self, but then ran to kitchen to get knife, per report. when asked if was trying to harm self, pt states \"anyway I can. \" Denies specific plan. History Of Present Illness: The patient is a 21 y.o. female with chiari malformation, CKD, seizure DO who presented to [facility] with complaint of SI. Patient was seen and evaluated in the ED by the ED medical provider, patient was medically cleared for admission to [facility] at [facility]. This note serves as an admission medical H&P.    PCP: No primary care provider on file. Tobacco use: yes  ETOH use: denies  Illicit drug use: denies     Patient denies any medical complaints. Past Medical History:        Diagnosis Date    Asthma     Chiari malformation     Chronic kidney disease     kidney stones    Seizures (HCC)     Epilepsy    Unspecified mood (affective) disorder ([facility] [address] 8/21/2021       Past Surgical History:        Procedure Laterality Date    TONSILLECTOMY         Medications Prior to Admission:    Prior to Admission medications    Not on File       Allergies:  Patient has no known allergies. Social History:  The patient currently lives at home    TOBACCO:   reports that she has never smoked. She has never used smokeless tobacco.  ETOH:   reports no history of alcohol use.       Family History:   Positive as follows:        Problem Relation Age of Onset    Depression Mother     High Blood Pressure Mother     Allergy (Severe) Sister     Anemia Sister     Asthma Sister     Depression Sister     Kidney Disease Sister     Mental Illness Sister     Anemia Brother     Asthma Brother     Breast Cancer Maternal Grandfather     Hearing Loss Maternal Grandfather     Cancer Paternal Grandmother     Prostate Cancer Paternal Grandmother        REVIEW OF SYSTEMS:     Constitutional: Negative for fever   HENT: Negative for sore throat   Eyes: Negative for redness   Respiratory: Negative  for dyspnea, cough   Cardiovascular: Negative for chest pain   Gastrointestinal: Negative for vomiting, diarrhea   Genitourinary: Negative for hematuria   Musculoskeletal: Negative for arthralgias   Skin: Negative for rash   Neurological: Negative for syncope    Hematological: Negative for easy bruising/bleeding   Psychiatric/Behavorial: Per psychiatry team evaluation     PHYSICAL EXAM:    BP 96/62   Pulse 79   Temp 98.9 °F (37.2 °C) (Temporal)   Resp 16   Ht 5' 3\" (1.6 m)   Wt 164 lb (74.4 kg)   LMP  (LMP Unknown)   SpO2 99%   Breastfeeding No   BMI 29.05 kg/m²   Gen: No distress. Alert. Eyes:  No conjunctival injection. ENT: No discharge. Pharynx clear. Neck:  Trachea midline. Resp: No accessory muscle use. No crackles. No wheezes. No rhonchi. CV: Regular rate. Regular rhythm. + murmur. No rub. No edema. GI: Non-tender. Non-distended. Normal bowel sounds. Skin: Warm and dry. No nodule on exposed extremities. No rash on exposed extremities. M/S: No cyanosis. No joint deformity. No clubbing. Neuro: Awake. No focal neurologic deficit on exam.  Cranial nerves II through XII intact. Patient is able to ambulate without difficulty.   Psych: Per psychiatry team evaluation     CBC:   Recent Labs     08/21/21  1340   WBC 6.7   HGB 10.8*   HCT 33.3*   MCV 86.5        BMP:   Recent Labs     08/21/21  1340   *   K 3.8      CO2 22   BUN 13   CREATININE 0.8     LIVER PROFILE:   Recent Labs     08/21/21  1340   AST 16   ALT 13   BILITOT 1.0   ALKPHOS 60     UA:  Recent Labs     08/21/21  1345   COLORU Yellow   PHUR 6.0  6.0   WBCUA 3-5

## 2021-08-23 PROCEDURE — 1240000000 HC EMOTIONAL WELLNESS R&B

## 2021-08-23 PROCEDURE — 6370000000 HC RX 637 (ALT 250 FOR IP): Performed by: PSYCHIATRY & NEUROLOGY

## 2021-08-23 PROCEDURE — 99233 SBSQ HOSP IP/OBS HIGH 50: CPT | Performed by: PSYCHIATRY & NEUROLOGY

## 2021-08-23 RX ADMIN — SERTRALINE 25 MG: 50 TABLET, FILM COATED ORAL at 16:51

## 2021-08-23 ASSESSMENT — LIFESTYLE VARIABLES: HISTORY_ALCOHOL_USE: NO

## 2021-08-23 ASSESSMENT — SLEEP AND FATIGUE QUESTIONNAIRES
AVERAGE NUMBER OF SLEEP HOURS: 8
DO YOU HAVE DIFFICULTY SLEEPING: NO
DO YOU USE A SLEEP AID: NO

## 2021-08-23 NOTE — GROUP NOTE
Group Therapy Note    Date: 8/23/2021    Group Start Time: 1100  Group End Time: 0770  Group Topic: Art Therapy     [address]        Group Therapy Note    Attendees: 13    Group members were instructed to create a drawing of a boundary. Group members were then encouraged to process their drawings as a group; discussing interpersonal boundary perceptions, challenges and needs. Notes:  Andreea Bae was engaged in the arts psychotherapy process. She was able to create a drawing and share her description of the drawing and process the drawing with fellow group members. She was able to understand and discuss the need for boundaries and the benefits to maintaining boundaries with others. She shared that she struggles with setting boundaries with her Mother and is often guilt-tripped when attempting to express herself. Status After Intervention:  Improved    Participation Level:  Active Listener and Interactive    Participation Quality: Appropriate, Attentive and Sharing      Speech:  hesitant      Thought Process/Content: Logical  Linear      Affective Functioning: Constricted/Restricted      Mood: anxious      Level of consciousness:  Alert, Oriented x4 and Attentive      Response to Learning: Able to verbalize current knowledge/experience, Able to verbalize/acknowledge new learning, Able to retain information and Progressing to goal      Endings: None Reported    Modes of Intervention: Exploration, Clarifying and Activity      Discipline Responsible: Psychoeducational Specialist      Signature:  Marija White MS, ATR-BC

## 2021-08-23 NOTE — PLAN OF CARE
Problem: Depressive Behavior With or Without Suicide Precautions:  Goal: Able to verbalize acceptance of life and situations over which he or she has no control  Description: Able to verbalize acceptance of life and situations over which he or she has no control  Outcome: Ongoing  Goal: Able to verbalize and/or display a decrease in depressive symptoms  Description: Able to verbalize and/or display a decrease in depressive symptoms  8/22/2021 2239 by Pao Meadows RN  Outcome: Ongoing  8/22/2021 1315 by Melony Rodriguez LPN  Outcome: Ongoing  Goal: Able to verbalize support systems  Description: Able to verbalize support systems  Outcome: Ongoing  Goal: Absence of self-harm  Description: Absence of self-harm  8/22/2021 2239 by Pao Meadows RN  Outcome: Ongoing  8/22/2021 1315 by Melony Rodriguez LPN  Outcome: Ongoing  Goal: Participates in care planning  Description: Participates in care planning  Outcome: Ongoing   Patient denied SI/HI,A/V hallucinations. She was upset earlier in the shift but he talked to family and that helped to decrease her anxiety. She attended groups tonight and was social with peers. She denied any pain. She said that she would come to the staff if thoughts of self harm were to occur. Safety checks continue Q 15 minutes through out the shift.

## 2021-08-23 NOTE — GROUP NOTE
Group Therapy Note    Date: 8/23/2021    Group Start Time: 1600  Group End Time: 7458  Group Topic: Healthy Living/Wellness    [address] [place], RN        Group Therapy Note    Attendees:          Patient's Goal:  wellness    Notes:  Engaged and pleasant    Status After Intervention:  Improved    Participation Level:  Active Listener    Participation Quality: Appropriate      Speech:  normal      Thought Process/Content: Logical  Linear      Affective Functioning: Congruent      Mood: euthymic      Level of consciousness:  Alert      Response to Learning: Able to verbalize current knowledge/experience and Able to verbalize/acknowledge new learning      Endings: None Reported    Modes of Intervention: Education      Discipline Responsible: Registered Nurse      Signature:  Edgardo Reddy RN
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
## 2021-08-23 NOTE — GROUP NOTE
Group Therapy Note    Date: 8/22/2021    Group Start Time: 2030  Group End Time: 2110  Group Topic: Wrap-Up    [facility]        Group Therapy Note    Attendees: Goals and importance of goal setting discussed. Night time milieu activities discussed. Patient's Goal:  Focus on self    Notes:  Successful     Status After Intervention:  Improved    Participation Level:  Active Listener and Interactive    Participation Quality: Appropriate and Attentive      Speech:  normal      Thought Process/Content: Logical  Linear      Affective Functioning: Congruent      Mood: anxious      Level of consciousness:  Alert and Oriented x4      Response to Learning: Progressing to goal      Endings: None Reported    Modes of Intervention: Support      Discipline Responsible: [employer]      Signature:  Leobardo Ying

## 2021-08-23 NOTE — PROGRESS NOTES
Department of Psychiatry  Progress Note    Patient's chart was reviewed. Discussed with treatment team. Met with patient. SUBJECTIVE:      History is consistent with post-partum depression. Continues to endorse a number of symptoms of depression; but feels groups here have been helpful. SI resolving. Discussed treatment options at length and agreed to a Zoloft trial.       Says she is very close with her mother. ROS:   Patient has new complaints: no  Sleeping adequately:  Yes   Appetite adequate: Yes  Engaged in programming: Yes    OBJECTIVE:  VITALS:  /66   Pulse 81   Temp 96.8 °F (36 °C) (Temporal)   Resp 16   Ht 5' 3\" (1.6 m)   Wt 164 lb (74.4 kg)   LMP  (LMP Unknown)   SpO2 98%   Breastfeeding No   BMI 29.05 kg/m²     Mental Status Examination:    Appearance: good grooming and hygiene  Behavior/Attitude toward examiner:  cooperative, attentive and fair eye contact  Speech: Normal rate, volume, amount  Mood:  \"down\"  Affect:  tearful   Thought processes:  Goal directed, linear, no ARCHANA or gross disorganization  Thought Content: less SI, no HI, no delusions voiced, no obsessions  Perceptions: no AVH  Attention: attention span and concentration were intact to interview   Abstraction: intact  Cognition:  Alert and oriented to person, place, time, and situation, recall intact  Insight: fair  Judgment: fair    Medication:  Scheduled:   nicotine  1 patch Transdermal Daily        PRN:  nicotine polacrilex, acetaminophen, magnesium hydroxide, aluminum & magnesium hydroxide-simethicone, hydrOXYzine, OLANZapine **OR** OLANZapine, sterile water, benztropine mesylate, traZODone     ASSESSMENT AND PLAN:  Patient is a 21 y.o. female with no past psychiatric history who was brought to the ED by police and EMS after demonstrating self harm at her mother's home.     Principal Problem:    Severe episode of recurrent major depressive disorder, without psychotic features ([facility] [address]  Active Problems: Seizure disorder ([facility] [address]    History of Chiari malformation  Resolved Problems:    * No resolved hospital problems. *     1. Admitted to inpatient psychiatry for stabilization and treatment. 2.On admission, ordered q15min checks for safety, programming, and prn medication for anxiety,agitation, and insomnia. Held on starting a scheduled psychotropic.     8/23/2021 - start Zoloft 25mg today and increase to 50mg tomorrow. 3.Internal medicine consult for admission. Seizure DO   Chiari Malformation   - not on any AED   - Used to be on Topamax but had memory issues and stopped medications  - Was also on Keppra and Onfi in the past as well    - Last seizure ~2 years ago   - Follows with Dr. Dallas Pressley but has not been there since Dec 2020      Heart Murmur  - reports known murmur   - stable     4. ELOS 5-8 days for stabilization. Total time with patient was 35 minutes and more than 50 % of that time was spent counseling the patient on their symptoms, treatment, and expected goals.      Rondall Romberg, MD

## 2021-08-23 NOTE — BH NOTE
Purposeful Rounding    Patient Location: Day room    Patient willing to engage in conversation: Yes    Presentation/behavior: Cooperative    Affect: Brightens with interaction    Concerns reported: None    PRN medications given: N/A    Environmental assessment: No safety hazards noted    Fall prevention interventions in place: Patient not currently a fall risk    Daily [place] Fall Risk Score: 67    Daily Kendrick Fall Risk Score:  Low

## 2021-08-23 NOTE — PLAN OF CARE
Problem: Depressive Behavior With or Without Suicide Precautions:  Goal: Able to verbalize acceptance of life and situations over which he or she has no control  Description: Able to verbalize acceptance of life and situations over which he or she has no control  8/23/2021 1016 by Jeanne Gamez RN  Outcome: Ongoing  Goal: Able to verbalize and/or display a decrease in depressive symptoms  Description: Able to verbalize and/or display a decrease in depressive symptoms  8/23/2021 1016 by Jeanne Gamez RN  Outcome: Ongoing  Goal: Able to verbalize support systems  Description: Able to verbalize support systems  8/23/2021 1016 by Jeanne Gamez RN  Outcome: Ongoing  Goal: Absence of self-harm  Description: Absence of self-harm  8/23/2021 1016 by Jeanne Gamez RN  Outcome: Ongoing  Goal: Participates in care planning  Description: Participates in care planning  8/23/2021 1016 by Jeanne Gamez RN  Outcome: Ongoing    Patient is interactive with staff. Patient denies SI/HI/AVH. Patient states they slept good last night. She reports feeling \"anxious. \" Patient compliant with medications. Patient is cooperative.

## 2021-08-23 NOTE — CARE COORDINATION
[facility]  Treatment Team Note  Day 1    Review Date & Time: 0900 8/23/2021      Patient was not in treatment team      Status EXAM:   Status and Exam  Normal: No  Facial Expression: Brightened, Sad (At times)  Affect: Congruent  Level of Consciousness: Alert  Mood:Normal: No  Mood: Depressed, Anxious, Sad  Motor Activity:Normal: Yes  Motor Activity: Decreased  Interview Behavior: Cooperative  Preception: [place] to Person, [place] Abelson to Time, [place] to Place, [place] to Situation  Attention:Normal: Yes  Thought Processes: Other(See comment) (Linear)  Thought Content:Normal: Yes  Thought Content: Other(See Comment) (WNL)  Hallucinations: None (Pt Denies)  Delusions: No  Memory:Normal: Yes  Insight and Judgment: No  Insight and Judgment: Poor Judgment, Poor Insight  Present Suicidal Ideation: No  Present Homicidal Ideation: No      Suicide Risk CSSR-S:  1) Within the past month, have you wished you were dead or wished you could go to sleep and not wake up? : No (not since coming to the ED)  2) Have you actually had any thoughts of killing yourself? : No (not since coming to the ED)  3) Have you been thinking about how you might kill yourself? : No (not since coming to the ED)  5) Have you started to work out or worked out the details of how to kill yourself? Do you intend to carry out this plan? : No (not since coming to the ED)  6) Have you ever done anything, started to do anything, or prepared to do anything to end your life?: Yes      PLAN/TREATMENT RECOMMENDATIONS UPDATE: Patient will take medication as prescribed, eat 75% of meals, attend groups, participate in milieu activities, participate in treatment team and care planning for discharge and follow up.         Melvi Gutierrez RN

## 2021-08-23 NOTE — PROGRESS NOTES
...Purposeful Rounding    Patient Location: Patient room    Patient willing to engage in conversation: No    Presentation/behavior: sleeping    Affect: sleeping    Concerns reported: no    PRN medications given: no    Environmental assessment: No safety hazards noted    Fall prevention interventions in place: n/a    Daily [place] Fall Risk Score: 55    Daily Kendrick Fall Risk Score: 0

## 2021-08-23 NOTE — BH NOTE
Libby Child contacted, she stated, \"My daughter is of legal age and capable of making her own decisions. I am not her legal guardian. \" Management aware.

## 2021-08-23 NOTE — BH NOTE
Purposeful Rounding     Patient Location: Day room     Patient willing to engage in conversation: Yes     Presentation/behavior: Cooperative     Affect: Brightens with interaction     Concerns reported: None     PRN medications given: N/A     Environmental assessment: No safety hazards noted     Fall prevention interventions in place: Patient not currently a fall risk     Daily [place] Fall Risk Score: 67     Daily Kendrick Fall Risk Score:  Low

## 2021-08-23 NOTE — BH NOTE
Purposeful Rounding     Patient Location: Day room     Patient willing to engage in conversation: Yes     Presentation/behavior: Cooperative     Affect: Brightens with interaction     Concerns reported: None     PRN medications given: N/A     Environmental assessment: No safety hazards noted     Fall prevention interventions in place: Patient not currently a fall risk     Daily [place] Fall Risk Score: 67     Daily Kendrick Fall Risk Score: Low

## 2021-08-24 PROBLEM — F33.2 SEVERE EPISODE OF RECURRENT MAJOR DEPRESSIVE DISORDER, WITHOUT PSYCHOTIC FEATURES (HCC): Status: ACTIVE | Noted: 2021-08-21

## 2021-08-24 PROCEDURE — 6370000000 HC RX 637 (ALT 250 FOR IP): Performed by: PSYCHIATRY & NEUROLOGY

## 2021-08-24 PROCEDURE — 99233 SBSQ HOSP IP/OBS HIGH 50: CPT | Performed by: PSYCHIATRY & NEUROLOGY

## 2021-08-24 PROCEDURE — 1240000000 HC EMOTIONAL WELLNESS R&B

## 2021-08-24 RX ADMIN — SERTRALINE 50 MG: 50 TABLET, FILM COATED ORAL at 08:56

## 2021-08-24 NOTE — BH NOTE
Purposeful Rounding    Patient Location: Day room    Patient willing to engage in conversation: Yes    Presentation/behavior: Cooperative    Affect: Brightens with interaction    Concerns reported: none    PRN medications given: none    Environmental assessment: Room free from clutter and Clear path to bathroom     Fall prevention interventions in place: Yellow non-skid socks on    Daily [place] Fall Risk Score: 67    Daily Kendrick Fall Risk Score: 15

## 2021-08-24 NOTE — PLAN OF CARE
Problem: Depressive Behavior With or Without Suicide Precautions:  Goal: Able to verbalize and/or display a decrease in depressive symptoms  Description: Able to verbalize and/or display a decrease in depressive symptoms  8/23/2021 2301 by Rl Humphrey RN  Outcome: Ongoing      Carlene Schwab was visible and social in the milieu this evening. She spent time in [employer] with peers, listening to music, talking and playing cards. Carlene Schwab attended all of the evening groups. During 1:1 care interview, Carlene Schwab stated that today was a good day. She stated that she attended all of the groups and that she had a visit from her grandmother which went well. Carlene Schwab denied suicidal and homicidal ideations this evening. She also denied hallucinations.

## 2021-08-24 NOTE — PROGRESS NOTES
...Purposeful Rounding     Patient Location: Patient room     Patient willing to engage in conversation: No     Presentation/behavior: sleeping     Affect: sleeping     Concerns reported: no     PRN medications given: no     Environmental assessment: Room free from clutter and Clear path to bathroom      Fall prevention interventions in place: Yellow non-skid socks on     Daily [place] Fall Risk Score: 67     Daily Kendrick Fall Risk Score: 15

## 2021-08-24 NOTE — PLAN OF CARE
Problem: Depressive Behavior With or Without Suicide Precautions:  Goal: Able to verbalize and/or display a decrease in depressive symptoms  Description: Able to verbalize and/or display a decrease in depressive symptoms  Outcome: Ongoing     Problem: Depressive Behavior With or Without Suicide Precautions:  Goal: Absence of self-harm  Description: Absence of self-harm  Outcome: Ongoing   Thurmond Osgood has been absent from self harm this shift. Patient currently denies SI/HI/AVH. Patient has been medication compliant. Will continue to monitor for safety.

## 2021-08-24 NOTE — GROUP NOTE
Group Therapy Note    Date: 8/24/2021    Group Start Time: 1300  Group End Time: 8491  Group Topic: [facility] Mariana, MSW        Group Therapy Note    Mode of Intervention: Gricelda Analysis    Song Used: Have You Ever Seen the Rain by CCR    Patients explored aspects of depression that limit a person's ability to observe and experience positive circumstance. Group members then engaged in individual activity brainstorming positive aspects of past, present, and future. Attendees: 7         Notes:  Pt present and positively engaged across discussions, worked across time allotted to brainstorm positive aspects of past, present, and future. Appropriately social throughout. Discussed positive relationships with children and time present at [facility] as \"only good things in my present. \"    Status After Intervention:  Improved    Participation Level:  Active Listener and Interactive    Participation Quality: Appropriate and Sharing      Speech:  normal      Thought Process/Content: Logical      Affective Functioning: Congruent      Mood: euthymic      Level of consciousness:  Alert and Attentive      Response to Learning: Capable of insight and Progressing to goal      Endings: None Reported    Modes of Intervention: Education, Socialization, Exploration, Activity and Media      Discipline Responsible: Psychoeducational Specialist      Signature:  Romulo Richards, LALO, MT-BC

## 2021-08-24 NOTE — GROUP NOTE
Group Therapy Note    Date: 8/24/2021    Group Start Time: 8459  Group End Time: 1200  [facility]        Group Therapy Note    Attendees: 8         Patient's Goal:  Patient will complete worksheet on acceptance. Will discuss how acceptance in life contributes to positive mental health. Notes:  Patient engaged well in group. Completed the worksheet and discussed. Verbalized an understanding of the topic and gave examples of how acceptance applied to current problems in life. Received support and feedback from peers. Status After Intervention:  Improved    Participation Level:  Active Listener and Interactive    Participation Quality: Appropriate, Attentive, Sharing and Supportive    Speech:  normal    Thought Process/Content: Logical Linear    Affective Functioning: Congruent    Mood: anxious and depressed    Level of consciousness:  Oriented x4    Response to Learning: Able to verbalize current knowledge/experience    Endings: None Reported    Modes of Intervention: Education, Support, Socialization and Exploration    Discipline Responsible: /Counselor    Signature:  Bebeto Andrade, [facility]

## 2021-08-25 VITALS
WEIGHT: 164 LBS | RESPIRATION RATE: 18 BRPM | HEART RATE: 96 BPM | HEIGHT: 63 IN | OXYGEN SATURATION: 97 % | TEMPERATURE: 98.5 F | SYSTOLIC BLOOD PRESSURE: 107 MMHG | BODY MASS INDEX: 29.06 KG/M2 | DIASTOLIC BLOOD PRESSURE: 62 MMHG

## 2021-08-25 PROCEDURE — 6370000000 HC RX 637 (ALT 250 FOR IP): Performed by: PSYCHIATRY & NEUROLOGY

## 2021-08-25 PROCEDURE — 99239 HOSP IP/OBS DSCHRG MGMT >30: CPT | Performed by: PSYCHIATRY & NEUROLOGY

## 2021-08-25 PROCEDURE — 5130000000 HC BRIDGE APPOINTMENT

## 2021-08-25 RX ORDER — NICOTINE 21 MG/24HR
1 PATCH, TRANSDERMAL 24 HOURS TRANSDERMAL DAILY
Qty: 15 PATCH | Refills: 0 | Status: SHIPPED | OUTPATIENT
Start: 2021-08-26 | End: 2021-09-10

## 2021-08-25 RX ADMIN — SERTRALINE 50 MG: 50 TABLET, FILM COATED ORAL at 09:05
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
## 2021-08-25 NOTE — PROGRESS NOTES
Department of Psychiatry  Progress Note    Patient's chart was reviewed. Discussed with treatment team. Met with patient. SUBJECTIVE:      Overall doing better. Tolerating Zoloft well and without side effects. Active in the milieu. ROS:   Patient has new complaints: no  Sleeping adequately:  Yes   Appetite adequate: Yes  Engaged in programming: Yes    OBJECTIVE:  VITALS:  /78   Pulse 97   Temp 98.8 °F (37.1 °C) (Temporal)   Resp 16   Ht 5' 3\" (1.6 m)   Wt 164 lb (74.4 kg)   LMP  (LMP Unknown)   SpO2 98%   Breastfeeding No   BMI 29.05 kg/m²     Mental Status Examination:    Appearance: good grooming and hygiene  Behavior/Attitude toward examiner:  cooperative, attentive and fair eye contact  Speech: Normal rate, volume, amount  Mood:  \"better\"  Affect:  less tearful   Thought processes:  Goal directed, linear, no ARCHANA or gross disorganization  Thought Content: no SI, no HI, no delusions voiced, no obsessions  Perceptions: no AVH  Attention: attention span and concentration were intact to interview   Abstraction: intact  Cognition:  Alert and oriented to person, place, time, and situation, recall intact  Insight: fair  Judgment: fair    Medication:  Scheduled:   sertraline  50 mg Oral Daily    nicotine  1 patch Transdermal Daily        PRN:  nicotine polacrilex, acetaminophen, magnesium hydroxide, aluminum & magnesium hydroxide-simethicone, hydrOXYzine, OLANZapine **OR** OLANZapine, sterile water, benztropine mesylate, traZODone     ASSESSMENT AND PLAN:  Patient is a 21 y.o. female with no past psychiatric history who was brought to the ED by police and EMS after demonstrating self harm at her mother's home. Principal Problem:    Severe episode of recurrent major depressive disorder, without psychotic features ([address]  Active Problems:    Seizure disorder ([facility] [address]    History of Chiari malformation  Resolved Problems:    * No resolved hospital problems. *     1. Admitted to inpatient psychiatry for stabilization and treatment. 2.On admission, ordered q15min checks for safety, programming, and prn medication for anxiety,agitation, and insomnia. Held on starting a scheduled psychotropic.     8/23/2021 - started Zoloft 25mg  8/24/2021 -  increase Zoloft to 50mg daily. 3.Internal medicine consult for admission. Seizure DO   Chiari Malformation   - not on any AED   - Used to be on Topamax but had memory issues and stopped medications  - Was also on Keppra and Onfi in the past as well    - Last seizure ~2 years ago   - Follows with Dr. Yoli Quevedo but has not been there since Dec 2020      Heart Murmur  - reports known murmur   - stable     4. ELOS 5-8 days for stabilization. Total time with patient was 35 minutes and more than 50 % of that time was spent counseling the patient on their symptoms, treatment, and expected goals.      Jeannette Ansari MD

## 2021-08-25 NOTE — BH NOTE
[facility]  Discharge Note    Pt discharged with followings belongings:   Dentures: None  Vision - Corrective Lenses: None  Hearing Aid: None  Jewelry: Other (Comment) (nose ring)  Body Piercings Removed: No  Clothing: Other (Comment) (Underwear (5))  Were All Patient Medications Collected?: Not Applicable  Other Valuables: None   Valuables sent home with patient or returned to patient. Patient education on aftercare instructions: yes. Information faxed to [facility] by this writer  at 12:46 PM .Patient verbalize understanding of AVS:  yes. Status EXAM upon discharge:  Status and Exam  Normal: Yes  Facial Expression: Brightened  Affect: Appropriate  Level of Consciousness: Alert  Mood:Normal: Yes  Mood: Anxious  Motor Activity:Normal: Yes  Motor Activity: Decreased  Interview Behavior: Cooperative  Preception: [place] to Time, [place] to Place, [place] to Situation, [place] to Person  Attention:Normal: Yes  Thought Processes:  (Linear)  Thought Content:Normal: Yes  Thought Content:  (WNL)  Hallucinations: None  Delusions: No  Memory:Normal: Yes  Insight and Judgment:  (improving)  Insight and Judgment: Poor Judgment (Improving)  Present Suicidal Ideation: No  Present Homicidal Ideation: No      Metabolic Screening:    No results found for: LABA1C    No results found for: CHOL  No results found for: TRIG  No results found for: HDL  No components found for: LDLCAL  No results found for: Guille Sims RN    Bridge Appointment completed: Reviewed Discharge Instructions with patient. Patient verbalizes understanding and agreement with the discharge plan using the teachback method.      Referral for Outpatient Tobacco Cessation Counseling, upon discharge (ricardo X if applicable and completed):    ( )  Hospital staff assisted patient to call Quit Line or faxed referral                                   during hospitalization                  (X)  Recognizing danger situations (included triggers and roadblocks), if not completed on admission                    (X)  Coping skills (new ways to manage stress, exercise, relaxation techniques, changing routine, distraction), if not completed on admission                                                           (X)  Basic information about quitting (benefits of quitting, techniques in how to quit, available resources, if not completed on admission  ( ) Referral for counseling faxed to [facility]   ( ) Patient refused referral  ( ) Patient refused counseling  ( ) Patient refused smoking cessation medication upon discharge    Vaccinations (ricardo X if applicable and completed):  ( ) Patient states already received influenza vaccine elsewhere  ( ) Patient received influenza vaccine during this hospitalization  ( ) Patient refused influenza vaccine at this time  (X) Not offered

## 2021-08-25 NOTE — GROUP NOTE
Group Therapy Note    Date: 8/25/2021    Group Start Time: 1000  Group End Time: 1050  Group Topic: Psychoeducation    [address]        Group Therapy Note    Attendees: 7         Patient's Goal: learn healthy coping skills that start with each letter of the alphabet and to apply to our lives. Notes: pt attended group and actively participated in the group discussion. Pt able to apply to herself. Pt reported that she needs be have a better balance in her life and take some time outs. Status After Intervention:  Improved    Participation Level:  Active Listener and Interactive    Participation Quality: Appropriate, Attentive, Sharing and Supportive      Speech:  normal      Thought Process/Content: Logical      Affective Functioning: Congruent      Mood: anxious and depressed      Level of consciousness:  Alert, Oriented x4 and Attentive      Response to Learning: Able to verbalize current knowledge/experience, Able to verbalize/acknowledge new learning and Progressing to goal      Endings: None Reported    Modes of Intervention: Education, Support, Socialization, Exploration, Clarifying and Activity      Discipline Responsible: /Counselor      Signature:  ANNEL Segovia

## 2021-08-25 NOTE — PLAN OF CARE
Patient has been out with patient group & has been social. Patient was pleasant & verbal during 1:1. Patient reported that she was here due to harming herself, by slamming her head on the wall & the floor & putting the strap of her purse, around her neck, Patient denied feelings of self harm & has made no attempts to harm herself, this shift. Patient reported feeling better, since admission. \"I'm not having the same thoughts, as I did, when I came here. \"I'm talking to people here, that understand me. \" Chidi Farr R.N.

## 2021-08-25 NOTE — BH NOTE
Group Therapy Note    Date: 8/24/2021  Start Time: 19:30  End Time:  20:30  Number of Participants: 8    Type of Group: Recreational  Wrap up    Ramon Greene Information  Module Name:  /  Session Number:  /    Patient's Goal:  Coping skills     Notes:  Continuing to work on goal    Status After Intervention:  Unchanged    Participation Level:  Active Listener and Interactive    Participation Quality: Appropriate, Attentive and Sharing      Speech:  pressured      Thought Process/Content: Logical      Affective Functioning: Blunted      Mood: anxious      Level of consciousness:  Alert, Oriented x4 and Attentive      Response to Learning: Able to change behavior and Progressing to goal      Endings: None Reported    Modes of Intervention: Socialization and Problem-solving      Discipline Responsible: [address] [employer]      Signature:  Piper Rodriguez

## 2021-08-25 NOTE — SUICIDE SAFETY PLAN
SAFETY PLAN    A suicide Safety Plan is a document that supports someone when they are having thoughts of suicide. Warning Signs that indicate a suicidal crisis may be developing: What (situations, thoughts, feelings, body sensations, behaviors, etc.) do you experience that lets you know you are beginning to think about suicide? 1. Feeling like life can't get worse  2. sadness  3. Feeling alone    Internal Coping Strategies:  What things can I do (relaxation techniques, hobbies, physical activities, etc.) to take my mind off my problems without contacting another person? 1. shower  2. Talk to people  3. cry  People and social settings that provide distraction: Who can I call or where can I go to distract me? 1. Name: Katie Sage Phone: [phone]  2. Fara Montana Phone: [phone]  6. Place: car rides           4. Place: outside    People whom I can ask for help: Who can I call when I need help - for example, friends, family, clergy, someone else? 1. Name: Katie Sage  Phone: [phone]  2. Name: Bere Santamaria Phone: [phone]  3. Name: Shonfrancesca Machados Phone: [phone]  Professionals or [address] [place] [address] I can contact during a crisis: Who can I call for help - for example, my doctor, my psychiatrist, my psychologist, a mental health provider, a suicide hotline? 1. Clinician Name: [facility]   SXF:[phone]    Clinician Pager or Emergency Contact #: [phone]    2. Clinician Name:     Phone:       Clinician Pager or Emergency Contact #:     3. Suicide Prevention Lifeline: 7-585-437-TALK (4444)    4. [address] [place] [address] Emergency Services -  for example, [facility], [facility]       Emergency Services Address: [phone]      Making the environment safe: How can I make my environment (house/apartment/living space) safer? For example, can I remove guns, medications, and other items?   1. Find help and dalk to my HC/P  2.  Go outside for a walk
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
